# Patient Record
(demographics unavailable — no encounter records)

---

## 2019-09-05 NOTE — EDM.PDOC
ED HPI GENERAL MEDICAL PROBLEM





- General


Chief Complaint: Bite:Animal, Insect


Stated Complaint: BEE STING


Time Seen by Provider: 09/05/19 21:33


Source of Information: Reports: Patient





- History of Present Illness


INITIAL COMMENTS - FREE TEXT/NARRATIVE: 





HISTORY AND PHYSICAL:


History of present illness:


[Patient presents with a bee sting on her right arm proximal forearm medial 

aspect wheal-and-flare lesion no residual stinger no fever nausea vomiting 

chills sweats no lip swelling tongue swelling or oral pharyngeal edema





]


Review of systems: 


As per history of present illness and below otherwise all systems reviewed and 

negative.


Past medical history: 


As per history of present illness and as reviewed below otherwise 

noncontributory.


Surgical history: 


As per history of present illness and as reviewed below otherwise 

noncontributory.


Social history: 


No reported history of drug or alcohol abuse.


Family history: 


As per history of present illness and as reviewed below otherwise 

noncontributory.


Physical exam:


HEENT: Atraumatic, normocephalic, pupils reactive, negative for conjunctival 

pallor or scleral icterus, mucous membranes moist, throat clear, neck supple, 

nontender, trachea midline.


Lungs: Clear to auscultation, breath sounds equal bilaterally, chest nontender.


Heart: S1S2, regular, negative for clicks, rubs, or JVD.


Abdomen: Soft, nondistended, nontender. Negative for masses or 

hepatosplenomegaly. Negative for costovertebral tenderness.


Pelvis: Stable nontender.


Genitourinary: Deferred.


Rectal: Deferred.


Extremities: Atraumatic, negative for cords or calf pain. Neurovascular 

unremarkable.


Neuro: Awake, alert, oriented. Cranial nerves II through XII unremarkable. 

Cerebellum unremarkable. Motor and sensory unremarkable throughout. Exam 

nonfocal.


Again as per history of present illness otherwise unremarkable





Diagnostics:


[] ankle





Therapeutics:


[ prednisone


Benadryl


Zantac


]


Impression: 


bee sting


Definitive disposition and diagnosis as appropriate pending reevaluation and 

review of above.


  ** right elbow


Pain Score (Numeric/FACES): 2





- Related Data


 Allergies











Allergy/AdvReac Type Severity Reaction Status Date / Time


 


nickel Allergy  Rash Verified 05/11/15 10:47











Home Meds: 


 Home Meds





Acetaminophen [Tylenol Extra Strength] 500 mg PO Q4H PRN #1 tab 05/12/15 [Rx]


Docusate Sodium [Colace] 100 mg PO BID PRN #1 cap 05/12/15 [Rx]


Ibuprofen [Motrin] 400 mg PO Q4H PRN #1 tablet 05/12/15 [Rx]


Lanolin [Lansinoh HPA] 1 g TOP ASDIRECTED PRN #1 crm 05/12/15 [Rx]











ED ROS GENERAL





- Review of Systems


Review Of Systems: See Below





ED EXAM, ANIMAL BITE





- Physical Exam


Exam: See Below





Course





- Vital Signs


Last Recorded V/S: 


 Last Vital Signs











Temp  97.2 F   09/05/19 21:30


 


Pulse  84   09/05/19 21:30


 


Resp  18   09/05/19 21:30


 


BP  137/92 H  09/05/19 21:30


 


Pulse Ox  95   09/05/19 21:30














- Orders/Labs/Meds


Orders: 


 Active Orders 24 hr











 Category Date Time Status


 


 Famotidine [Pepcid] Med  09/05/19 21:40 Once





 20 mg PO ONETIME ONE   











Meds: 


Medications














Discontinued Medications














Generic Name Dose Route Start Last Admin





  Trade Name Freq  PRN Reason Stop Dose Admin


 


Prednisone  20 mg  09/05/19 21:39  





  Prednisone  PO  09/05/19 21:40  





  ONETIME ONE   





     





     





     





     














Departure





- Departure


Time of Disposition: 21:42


Disposition: Home, Self-Care 01


Condition: Good


Clinical Impression: 


 Insect sting








- Discharge Information


Referrals: 


PCP,None [Primary Care Provider] - 


Forms:  ED Department Discharge


Additional Instructions: 


Benadryl 50 mg every 46 hours as needed


Zantac 150 mg by mouth twice a day may benefit


Medication as prescribed





Return if symptoms persist or worsen


Follow-up with primary care in 2 weeks sooner as needed





[facility]


[address] [place] [id number]


Phone: [phone]


Fax: [phone]








The following information is given to patients seen in the emergency department 

who are being discharged to home. This information is to outline your options 

for follow-up care. We provide all patients seen in our emergency department 

with a follow-up referral.





The need for follow-up, as well as the timing and circumstances, are variable 

depending upon the specifics of your emergency department visit.





If you don't have a primary care physician on staff, we will provide you with a 

referral. We always advise you to contact your personal physician following an 

emergency department visit to inform them of the circumstance of the visit and 

for follow-up with them and/or the need for any referrals to a consulting 

specialist.





The emergency department will also refer you to a specialist when appropriate. 

This referral assures that you have the opportunity for follow-up care with a 

specialist. All of these measure are taken in an effort to provide you with 

optimal care, which includes your follow-up.





Under all circumstances we always encourage you to contact your private 

physician who remains a resource for coordinating your care. When calling for 

follow-up care, please make the office aware that this follow-up is from your 

recent emergency room visit. If for any reason you are refused follow-up, 

please contact the [facility] emergency department at [phone] 

and asked to speak to the emergency department charge nurse.














- My Orders


Last 24 Hours: 


My Active Orders





09/05/19 21:40


Famotidine [Pepcid]   20 mg PO ONETIME ONE 














- Assessment/Plan


Last 24 Hours: 


My Active Orders





09/05/19 21:40


Famotidine [Pepcid]   20 mg PO ONETIME ONE